# Patient Record
Sex: MALE | Race: WHITE | NOT HISPANIC OR LATINO | URBAN - METROPOLITAN AREA
[De-identification: names, ages, dates, MRNs, and addresses within clinical notes are randomized per-mention and may not be internally consistent; named-entity substitution may affect disease eponyms.]

---

## 2024-03-24 ENCOUNTER — TELEPHONE (OUTPATIENT)
Dept: GASTROENTEROLOGY | Facility: HOSPITAL | Age: 54
End: 2024-03-24

## 2024-03-25 NOTE — TELEPHONE ENCOUNTER
Loraine/[phone]  Patient stopped taking Hadlima, feeling fine  Should the med have been discontinued?

## 2024-04-15 ENCOUNTER — TELEPHONE (OUTPATIENT)
Dept: GASTROENTEROLOGY | Facility: HOSPITAL | Age: 54
End: 2024-04-15

## 2024-04-15 NOTE — TELEPHONE ENCOUNTER
Loraine/[phone]  Patient stopped taking Hadlima. Said he is feeling great.  Should medication have been discontinued?

## 2024-04-15 NOTE — TELEPHONE ENCOUNTER
Loraine ( she stated she is his patient advocate) states that she spoke with Mr Borges on 3/4/2024 and he told her that he stopped adalimumab. He stated he felt great. He has not been in the office since June 2022. I will contact him and ask that he schedule a follow up Nicole